# Patient Record
Sex: MALE | Race: WHITE | Employment: FULL TIME | URBAN - METROPOLITAN AREA
[De-identification: names, ages, dates, MRNs, and addresses within clinical notes are randomized per-mention and may not be internally consistent; named-entity substitution may affect disease eponyms.]

---

## 2017-07-21 ENCOUNTER — OCCUPATIONAL MEDICINE (OUTPATIENT)
Dept: URGENT CARE | Facility: CLINIC | Age: 22
End: 2017-07-21
Payer: COMMERCIAL

## 2017-07-21 VITALS
DIASTOLIC BLOOD PRESSURE: 70 MMHG | BODY MASS INDEX: 18.96 KG/M2 | OXYGEN SATURATION: 99 % | RESPIRATION RATE: 16 BRPM | HEART RATE: 86 BPM | TEMPERATURE: 98.4 F | SYSTOLIC BLOOD PRESSURE: 118 MMHG | WEIGHT: 140 LBS | HEIGHT: 72 IN

## 2017-07-21 DIAGNOSIS — S01.81XA FACIAL LACERATION, INITIAL ENCOUNTER: ICD-10-CM

## 2017-07-21 DIAGNOSIS — S02.5XXA CLOSED FRACTURE OF TOOTH, INITIAL ENCOUNTER: ICD-10-CM

## 2017-07-21 LAB
AMP AMPHETAMINE: NORMAL
BREATH ALCOHOL COMMENT: NORMAL
COC COCAINE: NORMAL
INT CON NEG: NORMAL
INT CON POS: NORMAL
MET METHAMPHETAMINES: NORMAL
OPI OPIATES: NORMAL
PCP PHENCYCLIDINE: NORMAL
POC BREATHALIZER: 0 PERCENT (ref 0–0.01)
POC DRUG COMMENT 753798-OCCUPATIONAL HEALTH: NORMAL
THC: NORMAL

## 2017-07-21 PROCEDURE — 12013 RPR F/E/E/N/L/M 2.6-5.0 CM: CPT | Mod: 29 | Performed by: PHYSICIAN ASSISTANT

## 2017-07-21 PROCEDURE — 82075 ASSAY OF BREATH ETHANOL: CPT | Mod: 29 | Performed by: PHYSICIAN ASSISTANT

## 2017-07-21 PROCEDURE — 80305 DRUG TEST PRSMV DIR OPT OBS: CPT | Mod: 29 | Performed by: PHYSICIAN ASSISTANT

## 2017-07-21 ASSESSMENT — ENCOUNTER SYMPTOMS: LIP LACERATION: 1

## 2017-07-21 NOTE — Clinical Note
EMPLOYEE’S CLAIM FOR COMPENSATION/ REPORT OF INITIAL TREATMENT  FORM C-4    EMPLOYEE’S CLAIM - PROVIDE ALL INFORMATION REQUESTED   First Name  Asael Last Name  Ron Birthdate                    1995                Sex  male Claim Number N/A   Home Address  [address] Age  22 y.o. 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 (if applicable)  RE-INSTALLING AN ENGINE IN VEHICLE    How did this injury or occupational disease occur? (Be specific an answer in detail. Use additional sheet if necessary)  WAS USING A PAIR OF PLYERS ABOVE MY HEAD TO HOLD A BRACKET INSTALL BOLT PLYERS SLIPPED OUT OF HAND AND HIT MY UPPER LIP   If you believe that you have an occupational disease, when did you first have knowledge of the disability and it relationship to your employment?   Witnesses to the Accident  JANINA JEFFERSON      Nature of Injury or Occupational Disease  Laceration  Part(s) of Body Injured or Affected  Mouth, ,     I certify that the above is true and correct to the best of my knowledge and that I have  provided this information in order to obtain the benefits of [place] inclusive or Chapter 617 of [place]RS).  I hereby authorize any physician, chiropractor, surgeon, practitioner, or other person, any hospital, including [facility] or [facility] hospital, any medical service organization, any insurance company, or other institution or organization to release to each other, any medical or other information, including benefits paid or payable, pertinent to this injury or disease, except information relative to diagnosis, treatment and/or counseling for AIDS, psychological conditions, alcohol or controlled substances, for which I must give specific authorization.  A Photostat of this authorization shall be as valid as the original.     Date 7/21/2017   [facility]   Employee’s Signature   THIS REPORT MUST BE COMPLETED AND MAILED WITHIN 3 WORKING DAYS OF TREATMENT   [facility]  Name of Facility  [facility]   Date  7/21/2017 Diagnosis  (S01.81XA) Facial laceration, initial encounter  (S02.5XXA) Closed fracture of tooth, initial encounter Is there evidence the injured employee was under the influence of alcohol and/or another controlled substance at the time of accident?   Hour  12:25 PM Description of Injury or Disease  Diagnoses of Facial laceration, initial encounter and Closed fracture of tooth, initial encounter were pertinent to this visit. No   Treatment  Lac closed with 4 sutures. Referral to Dentistry for chipped tooth.  Wound care discussed  F/U 3 days.  Have you advised the patient to remain off work five days or more? No   X-Ray Findings      If Yes   From Date  To Date      From information given by the employee, together with medical evidence, can you directly connect this injury or occupational disease as job incurred?  Yes If No Full Duty  Yes Modified Duty      Is additional medical care by a  "physician indicated?  Yes If Modified Duty, Specify any Limitations / Restrictions      Do you know of any previous injury or disease contributing to this condition or occupational disease?                            No   Date  7/21/2017 Print Doctor’s Name Sonny Cantor PA-C I certify the employer’s copy of  this form was mailed on:   Address  [address] Insurer’s Use Only     [facility] Zip  [address]    Provider’s Tax ID Number  [id number]  Telephone  Dept: [phone]        isra-SONNY Blue PA-C   e-Signature: Dr. Adriano Mccord, Medical Director Degree  IFE[employer]        ORIGINAL-TREATING PHYSICIAN OR CHIROPRACTOR    PAGE 2-INSURER/TPA    PAGE 3-EMPLOYER    PAGE 4-EMPLOYEE             Form C-4 (rev10/07)              BRIEF DESCRIPTION OF RIGHTS AND BENEFITS  (Pursuant to NRS 616C.050)    Notice of Injury or Occupational Disease (Incident Report Form C-1): If an injury or occupational disease (OD) arises out of and in the  course of employment, you must provide written notice to your employer as soon as practicable, but no later than 7 days after the accident or  OD. Your employer shall maintain a sufficient supply of the required forms.    Claim for Compensation (Form C-4): If medical treatment is sought, the form C-4 is available at the place of initial treatment. A completed  \"Claim for Compensation\" (Form C-4) must be filed within 90 days after an accident or OD. The treating physician or chiropractor must,  within 3 working days after treatment, complete and mail to the employer, the employer's insurer and third-party , the Claim for  Compensation.    Medical Treatment: If you require medical treatment for your on-the-job injury or OD, you may be required to select a physician or  chiropractor from a list provided by your workers’ compensation insurer, if it has contracted with an Organization for Managed Care (MCO) or  Preferred Provider Organization " (PPO) or providers of health care. If your employer has not entered into a contract with an MCO or PPO, you  may select a physician or chiropractor from the Panel of Physicians and Chiropractors. Any medical costs related to your industrial injury or  OD will be paid by your insurer.    Temporary Total Disability (TTD): If your doctor has certified that you are unable to work for a period of at least 5 consecutive days, or 5  cumulative days in a 20-day period, or places restrictions on you that your employer does not accommodate, you may be entitled to TTD  compensation.    Temporary Partial Disability (TPD): If the wage you receive upon reemployment is less than the compensation for TTD to which you are  entitled, the insurer may be required to pay you TPD compensation to make up the difference. TPD can only be paid for a maximum of 24  months.    Permanent Partial Disability (PPD): When your medical condition is stable and there is an indication of a PPD as a result of your injury or  OD, within 30 days, your insurer must arrange for an evaluation by a rating physician or chiropractor to determine the degree of your PPD. The  amount of your PPD award depends on the date of injury, the results of the PPD evaluation and your age and wage.    Permanent Total Disability (PTD): If you are medically certified by a treating physician or chiropractor as permanently and totally disabled  and have been granted a PTD status by your insurer, you are entitled to receive monthly benefits not to exceed 66 2/3% of your average  monthly wage. The amount of your PTD payments is subject to reduction if you previously received a PPD award.    Vocational Rehabilitation Services: You may be eligible for vocational rehabilitation services if you are unable to return to the job due to a  permanent physical impairment or permanent restrictions as a result of your injury or occupational disease.    Transportation and Per Marisela  Reimbursement: You may be eligible for travel expenses and per rylee associated with medical treatment.    Reopening: You may be able to reopen your claim if your condition worsens after claim closure.    Appeal Process: If you disagree with a written determination issued by the insurer or the insurer does not respond to your request, you may  appeal to the Department of Administration, , by following the instructions contained in your determination letter. You must  appeal the determination within 70 days from the date of the determination letter at [address], [address], [place], [place]  [address], or [address], [address], [place], [place] [address]. If you disagree with the  decision, you may appeal to the  Department of Administration, . You must file your appeal within 30 days from the date of the  decision  letter at [address], [address], [place], [place] [address], or [address], [facility] [address], [place], [place] [address]. If you  disagree with a decision of an , you may file a petition for judicial review with the District Court. You must do so within 30  days of the Appeal Officer’s decision. You may be represented by an  at your own expense or you may contact the [facility] for possible  representation.    [place]  for Injured Workers (NAIW): If you disagree with a  decision, you may request that NAIW represent you  without charge at an  Hearing. For information regarding denial of benefits, you may contact the [facility] at: [address], [address], [place] [address], [phone], or [address] [facility] [address] [address], [phone]    To File a Complaint with the Division: If you wish to file a complaint with the  of the Division of Industrial Relations (DIR),  please contact the Workers’ Compensation Section, [address], [address], [place], [place] [address], telephone [phone]) [phone], or  [address], [address], [place], [place] [address], telephone [phone].    For assistance with Workers’ Compensation Issues: you may contact the Office of the [facility] Consumer Health Assistance, [address] [place] [address], [address], [place], [place] [address], Toll Free 1-423.822.2459, Web site: http://govcha.[facility].nv., E-mail  [email or web address].[email or web address].                                                                                                                                                                                                                                   __________________________________________________________________                                                                   ______7/21/17___________                Employee Name / Signature                                                                                                                                                       Date                                                                                                                                                                                                     D-2 (rev. 10/07)

## 2017-07-21 NOTE — Clinical Note
EMPLOYEE’S CLAIM FOR COMPENSATION/ REPORT OF INITIAL TREATMENT  FORM C-4    EMPLOYEE’S CLAIM - PROVIDE ALL INFORMATION REQUESTED   First Name  Asael Last Name  Ron Birthdate                    1995                Sex  male Claim Number N/A   Home Address  [address]  Age  22 y.o. 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 (if applicable)  RE-INSTALLING AN ENGINE IN VEHICLE    How did this injury or occupational disease occur? (Be specific an answer in detail. Use additional sheet if necessary)  WAS USING A PAIR OF PLYERS ABOVE MY HEAD TO HOLD A BRACKET INSTALL BOLT PLYERS SLIPPED OUT OF HAND AND HIT MY UPPER LIP   If you believe that you have an occupational disease, when did you first have knowledge of the disability and it relationship to your employment?   Witnesses to the Accident  JANINA JEFFERSON      Nature of Injury or Occupational Disease  Laceration  Part(s) of Body Injured or Affected  Mouth, ,     I certify that the above is true and correct to the best of my knowledge and that I have provided this  information in order to obtain the benefits of [place] ([place] 616A [place] 616D, inclusive or Chapter 617 of NRS).  I hereby authorize any physician, chiropractor, surgeon, practitioner, or other person, any hospital, including [facility], any medical service organization, any insurance company, or other institution or organization to release to each other, any medical or other information, including benefits paid or payable, pertinent to this injury or disease, except information relative to diagnosis, treatment and/or counseling for AIDS, psychological conditions, alcohol or controlled substances, for which I must give specific authorization.  A Photostat of this authorization shall be as valid as the original.     Date 7/21/2017   [facility]   Employee’s Signature   THIS REPORT MUST BE COMPLETED AND MAILED WITHIN 3 WORKING DAYS OF TREATMENT   Place  [facility]  Name of Facility  [facility]   Date  7/21/2017 Diagnosis  (S01.81XA) Facial laceration, initial encounter  (S02.5XXA) Closed fracture of tooth, initial encounter Is there evidence the injured employee was under the influence of alcohol and/or another controlled substance at the time of accident?   Hour  12:25 PM Description of Injury or Disease  Diagnoses of Facial laceration, initial encounter and Closed fracture of tooth, initial encounter were pertinent to this visit. No   Treatment  Lac closed with 4 sutures. Referral to Dentistry for chipped tooth.  Wound care discussed  F/U 3 days.  Have you advised the patient to remain off work five days or more? No   X-Ray Findings      If Yes   From Date  To Date      From information given by the employee, together with medical evidence, can you directly connect this injury or occupational disease as job incurred?  Yes If No Full Duty  Yes Modified Duty      Is additional medical care by a physician  "indicated?  Yes If Modified Duty, Specify any Limitations / Restrictions      Do you know of any previous injury or disease contributing to this condition or occupational disease?                            No   Date  7/21/2017 Print Doctor’s Name Sonny Cantor PA-C I certify the employer’s copy of  this form was mailed on:   Address  [address] Insurer’s Use Only     [facility] Zip  [address]    Provider’s Tax ID Number  [id number]  Telephone  Dept: [phone]        isra-SONNY Blue PA-C   e-Signature: Dr. Adriano Mccord, Medical Director Degree  IFEANYI        ORIGINAL-TREATING PHYSICIAN OR CHIROPRACTOR    PAGE 2-INSURER/TPA    PAGE 3-EMPLOYER    PAGE 4-EMPLOYEE             Form C-4 (rev10/07)              BRIEF DESCRIPTION OF RIGHTS AND BENEFITS  ([place]ursuant to [place]RS 616C.050)    Notice of Injury or Occupational Disease (Incident Report Form C-1): If an injury or occupational disease (OD) arises out of and in the  course of employment, you must provide written notice to your employer as soon as practicable, but no later than 7 days after the accident or  OD. Your employer shall maintain a sufficient supply of the required forms.    Claim for Compensation (Form C-4): If medical treatment is sought, the form C-4 is available at the place of initial treatment. A completed  \"Claim for Compensation\" (Form C-4) must be filed within 90 days after an accident or OD. The treating physician or chiropractor must,  within 3 working days after treatment, complete and mail to the employer, the employer's insurer and third-party , the Claim for  Compensation.    Medical Treatment: If you require medical treatment for your on-the-job injury or OD, you may be required to select a physician or  chiropractor from a list provided by your workers’ compensation insurer, if it has contracted with an Organization for Managed Care (MCO) or  Preferred Provider Organization (PPO) or " providers of health care. If your employer has not entered into a contract with an MCO or PPO, you  may select a physician or chiropractor from the Panel of Physicians and Chiropractors. Any medical costs related to your industrial injury or  OD will be paid by your insurer.    Temporary Total Disability (TTD): If your doctor has certified that you are unable to work for a period of at least 5 consecutive days, or 5  cumulative days in a 20-day period, or places restrictions on you that your employer does not accommodate, you may be entitled to TTD  compensation.    Temporary Partial Disability (TPD): If the wage you receive upon reemployment is less than the compensation for TTD to which you are  entitled, the insurer may be required to pay you TPD compensation to make up the difference. TPD can only be paid for a maximum of 24  months.    Permanent Partial Disability (PPD): When your medical condition is stable and there is an indication of a PPD as a result of your injury or  OD, within 30 days, your insurer must arrange for an evaluation by a rating physician or chiropractor to determine the degree of your PPD. The  amount of your PPD award depends on the date of injury, the results of the PPD evaluation and your age and wage.    Permanent Total Disability (PTD): If you are medically certified by a treating physician or chiropractor as permanently and totally disabled  and have been granted a PTD status by your insurer, you are entitled to receive monthly benefits not to exceed 66 2/3% of your average  monthly wage. The amount of your PTD payments is subject to reduction if you previously received a PPD award.    Vocational Rehabilitation Services: You may be eligible for vocational rehabilitation services if you are unable to return to the job due to a  permanent physical impairment or permanent restrictions as a result of your injury or occupational disease.    Transportation and Per Marisela Reimbursement: You  may be eligible for travel expenses and per rylee associated with medical treatment.    Reopening: You may be able to reopen your claim if your condition worsens after claim closure.    Appeal Process: If you disagree with a written determination issued by the insurer or the insurer does not respond to your request, you may  appeal to the Department of Administration, , by following the instructions contained in your determination letter. You must  appeal the determination within 70 days from the date of the determination letter at [address], [address], [place], [place]  [address], or [address], [place], [place] [address]. If you disagree with the  decision, you may appeal to the  Department of Administration, . You must file your appeal within 30 days from the date of the  decision  letter at [address], [address], [place], [place] [address], or [address], [place], [place] [address]. If you  disagree with a decision of an , you may file a petition for judicial review with the District Court. You must do so within 30  days of the Appeal Officer’s decision. You may be represented by an  at your own expense or you may contact the [facility] for possible  representation.    [place]  for Inj[place] Workers ([place]IW): If you disagree with a  decision, you may request that NAIW represent you  without charge at an  Hearing. For information regarding denial of benefits, you may contact the [facility] at: [address], [address], [place] [address], [phone], or [address], [place] [address], [phone]    To File a Complaint with the Division: If you wish to file a complaint with the  of the Division of Industrial Relations (DIR),  please contact the Workers’ Compensation Section, [address],  [address], [place], [place] [address], telephone ([phone]) [phone], or  [address], [address], [place], [place] [address], telephone [phone].    For assistance with Workers’ Compensation Issues: you may contact the Office of the [employer] Consumer Health Assistance, [address] [place] [address], [address], [place], [place] [address], Toll Free 1-630.146.8071, Web site: http://govcha.[facility].nv., E-mail  [email or web address].                                                                                                                                                                                                                                   __________________________________________________________________                                                                   ______7/21/17___________                Employee Name / Signature                                                                                                                                                       Date                                                                                                                                                                                                     D-2 (rev. 10/07)

## 2017-07-21 NOTE — PATIENT INSTRUCTIONS
Sutured Wound Care  Sutures are stitches that can be used to close wounds. Taking care of your wound properly can help to prevent pain and infection. It can also help your wound to heal more quickly.  HOW TO CARE FOR YOUR SUTURED WOUND  Wound Care  · Keep the wound clean and dry.  · If you were given a bandage (dressing), you should change it at least once per day or as directed by your health care provider. You should also change it if it becomes wet or dirty.  · Keep the wound completely dry for the first 24 hours or as directed by your health care provider. After that time, you may shower or bathe. However, make sure that the wound is not soaked in water until the sutures have been removed.  · Clean the wound one time each day or as directed by your health care provider.  ¨ Wash the wound with soap and water.  ¨ Rinse the wound with water to remove all soap.  ¨ Pat the wound dry with a clean towel. Do not rub the wound.  · After cleaning the wound, apply a thin layer of antibiotic ointment as directed by your health care provider. This will help to prevent infection and keep the dressing from sticking to the wound.  · Have the sutures removed as directed by your health care provider.  General Instructions  · Take or apply medicines only as directed by your health care provider.  · To help prevent scarring, make sure to cover your wound with sunscreen whenever you are outside after the sutures are removed and the wound is healed. Make sure to wear a sunscreen of at least 30 SPF.  · If you were prescribed an antibiotic medicine or ointment, finish all of it even if you start to feel better.  · Do not scratch or pick at the wound.  · Keep all follow-up visits as directed by your health care provider. This is important.  · Check your wound every day for signs of infection. Watch for:    ¨ Redness, swelling, or pain.  ¨ Fluid, blood, or pus.  · Raise (elevate) the injured area above the level of your heart while you  are sitting or lying down, if possible.  · Avoid stretching your wound.  · Drink enough fluids to keep your urine clear or pale yellow.  SEEK MEDICAL CARE IF:  · You received a tetanus shot and you have swelling, severe pain, redness, or bleeding at the injection site.  · You have a fever.  · A wound that was closed breaks open.  · You notice a bad smell coming from the wound.  · You notice something coming out of the wound, such as wood or glass.  · Your pain is not controlled with medicine.  · You have increased redness, swelling, or pain at the site of your wound.  · You have fluid, blood, or pus coming from your wound.  · You notice a change in the color of your skin near your wound.  · You need to change the dressing frequently due to fluid, blood, or pus draining from the wound.  · You develop a new rash.  · You develop numbness around the wound.  SEEK IMMEDIATE MEDICAL CARE IF:  · You develop severe swelling around the injury site.  · Your pain suddenly increases and is severe.  · You develop painful lumps near the wound or on skin that is anywhere on your body.  · You have a red streak going away from your wound.  · The wound is on your hand or foot and you cannot properly move a finger or toe.  · The wound is on your hand or foot and you notice that your fingers or toes look pale or bluish.     This information is not intended to replace advice given to you by your health care provider. Make sure you discuss any questions you have with your health care provider.     Document Released: 01/25/2006 Document Revised: 05/03/2016 Document Reviewed: 12/14/2015  [employer] Interactive Patient Education ©2016 [employer]
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
## 2017-07-21 NOTE — Clinical Note
[facility]   [address] - [place] [address]  Phone: [phone] - Fax: [phone]        [facility] Progress Report and Disability Certification  Date of Service: 7/21/2017   No Show:  No  Date / Time of Next Visit:  3 days   Claim Information   Patient Name: Asael Velasquez  Claim Number:  N/A   Employer: [employer]  Date of Injury: 7/21/2017     Insurer / TPA: [employer]  ID / SSN:     Occupation: TECH  Diagnosis: Diagnoses of Facial laceration, initial encounter and Closed fracture of tooth, initial encounter were pertinent to this visit.    Medical Information   Related to Industrial Injury? Yes    Subjective Complaints:  DOI: 7/21/17. Pt was re-installing car engine. Pt was using large pair of pliers overhead. Pt was using them to pull and they slipped and struck right above lip. Laceration. Small abrasion on inner lip. Front right upper tooth has small chip. Denies HA, vision changes, dizziness. No previous injury. No 2nd job. Tetanus UTD.   Objective Findings: 2-3cm straight lac superior to the upper lip, not full thickness. No FB. No facial bone tenderness. Small superficial abrasion on inner upper lip. Small chip of the left upper central incisor.  Procedure: Laceration Repair  -Risks including bleeding, nerve damage, infection, and poor cosmetic outcome discussed at length. Benefits and alternatives discussed.   -Sterile technique throughout  -Local anesthesia with 2% lidocaine  -Closed with #4  5-0 Nylon interrupted sutures with good wound approximation  -Polysporin and dressing placed  -Patient tolerated well   Pre-Existing Condition(s): None   Assessment:   Initial Visit    Status: Additional Care Required  Permanent Disability:No    Plan: Consultation  Comments:Dentist    Diagnostics:      Comments:       Disability Information   Status: Released to Full Duty    From:     Through:   Restrictions are:     Physical  Restrictions   Sitting:    Standing:    Stooping:    Bending:      Squatting:    Walking:    Climbing:    Pushing:      Pulling:    Other:    Reaching Above Shoulder (L):   Reaching Above Shoulder (R):       Reaching Below Shoulder (L):    Reaching Below Shoulder (R):      Not to exceed Weight Limits   Carrying(hrs):   Weight Limit(lb):   Lifting(hrs):   Weight  Limit(lb):     Comments:      Repetitive Actions   Hands: i.e. Fine Manipulations from Grasping:     Feet: i.e. Operating Foot Controls:     Driving / Operate Machinery:     Physician Name: Sonny Cantor PA-C Physician Signature: SONNY Moraes PA-C e-Signature: Dr. Adriano Mccord, Medical Director   Clinic Name / Location: [address] [place] [address] Clinic Phone Number: Dept: [phone]   Appointment Time: 12:15 Pm Visit Start Time: 12:25 PM   Check-In Time:  12:13 Pm Visit Discharge Time: 1:30P PM   Original-Treating Physician or Chiropractor    Page 2-Insurer/TPA    Page 3-Employer    Page 4-Employee

## 2017-07-21 NOTE — PROGRESS NOTES
Subjective:      Asael Velasquez is a 22 y.o. male who presents with Lip Laceration      DOI: 7/21/17. Pt was re-installing car engine. Pt was using large pair of pliers overhead. Pt was using them to pull and they slipped and struck right above lip. Laceration. Small abrasion on inner lip. Front right upper tooth has small chip. Denies HA, vision changes, dizziness. No previous injury. No 2nd job. Tetanus UTD.     Lip Laceration   The incident occurred less than 1 hour ago. The laceration is located on the face. The laceration is 3 cm in size. The laceration mechanism was a blunt object. The pain is mild. The pain has been improving since onset. He reports no foreign bodies present. His tetanus status is UTD.       ROS    Medications, Allergies, and current problem list reviewed today in Epic     Objective:     /70 mmHg  Pulse 86  Temp(Src) 36.9 °C (98.4 °F)  Resp 16  Ht 1.829 m (6')  Wt 63.504 kg (140 lb)  BMI 18.98 kg/m2  SpO2 99%     Physical Exam   Constitutional: He is oriented to person, place, and time. He appears well-developed and well-nourished. No distress.   HENT:   Head: Normocephalic and atraumatic.   Right Ear: External ear normal.   Left Ear: External ear normal.   Neurological: He is alert and oriented to person, place, and time.   Skin: Skin is warm and dry. He is not diaphoretic.   Psychiatric: He has a normal mood and affect. His behavior is normal. Judgment and thought content normal.   Nursing note and vitals reviewed.      2.5-3cm straight lac superior to the upper lip, not full thickness. No FB. No facial bone tenderness. Small superficial abrasion on inner upper lip. Small chip of the left upper central incisor.  Procedure: Laceration Repair  -Risks including bleeding, nerve damage, infection, and poor cosmetic outcome discussed at length. Benefits and alternatives discussed.   -Sterile technique throughout  -Local anesthesia with 2% lidocaine  -Closed with #4  5-0 Nylon  interrupted sutures with good wound approximation  -Polysporin and dressing placed  -Patient tolerated well       Assessment/Plan:     1. Facial laceration, initial encounter  POCT 6 Panel Urine Drug Screen    POCT Breath Alcohol Test   2. Closed fracture of tooth, initial encounter  REFERRAL TO DENTISTRY     Lac closed with 4 sutures.  Wound care discussed, handout given  Watch for signs of infection  OTC meds for pain  Tetanus UTD  Referral to Dentistry for chipped tooth  F/U in 3 days, 5-7 for removal  Full duty  Return to clinic or go to ED if symptoms worsen or persist. Indications for ED discussed at length. Patient voices understanding. Red flags discussed.      Please note that this dictation was created using voice recognition software. I have made every reasonable attempt to correct obvious errors, but I expect that there are errors of grammar and possibly content that I did not discover before finalizing the note.

## 2017-07-27 ENCOUNTER — OCCUPATIONAL MEDICINE (OUTPATIENT)
Dept: URGENT CARE | Facility: CLINIC | Age: 22
End: 2017-07-27
Payer: COMMERCIAL

## 2017-07-27 VITALS
WEIGHT: 140 LBS | DIASTOLIC BLOOD PRESSURE: 70 MMHG | BODY MASS INDEX: 18.96 KG/M2 | OXYGEN SATURATION: 100 % | RESPIRATION RATE: 16 BRPM | SYSTOLIC BLOOD PRESSURE: 100 MMHG | HEART RATE: 75 BPM | HEIGHT: 72 IN | TEMPERATURE: 98.7 F

## 2017-07-27 DIAGNOSIS — S01.81XD FACIAL LACERATION, SUBSEQUENT ENCOUNTER: ICD-10-CM

## 2017-07-27 DIAGNOSIS — S02.5XXD CLOSED FRACTURE OF TOOTH WITH ROUTINE HEALING, SUBSEQUENT ENCOUNTER: ICD-10-CM

## 2017-07-27 PROCEDURE — 99213 OFFICE O/P EST LOW 20 MIN: CPT | Mod: 29 | Performed by: NURSE PRACTITIONER

## 2017-07-27 ASSESSMENT — ENCOUNTER SYMPTOMS
EYES NEGATIVE: 1
NEUROLOGICAL NEGATIVE: 1
MUSCULOSKELETAL NEGATIVE: 1
CARDIOVASCULAR NEGATIVE: 1
GASTROINTESTINAL NEGATIVE: 1
ROS SKIN COMMENTS: LACERATION
SORE THROAT: 0
CONSTITUTIONAL NEGATIVE: 1
PSYCHIATRIC NEGATIVE: 1
HEADACHES: 0
RESPIRATORY NEGATIVE: 1
STRIDOR: 0

## 2017-07-27 NOTE — PROGRESS NOTES
Subjective:      Asael Velasquez is a 22 y.o. male who presents with Follow-Up      HPI  DOI: 7/21/17. Pt was re-installing car engine. Pt was using large pair of pliers overhead. Pt was using them to pull and they slipped and struck right above lip causing a laceration and abrasion to lip. Front right upper tooth has small chip for incident. Denies HA, vision changes, dizziness. No previous injury. No 2nd job. Tetanus UTD. Was seen in  that day, sutures were placed. Patient was referred to dentistry for tooth.     No past medical history on file.  No past surgical history on file.  Current Outpatient Prescriptions on File Prior to Visit   Medication Sig Dispense Refill   • ibuprofen (MOTRIN) 200 MG Tab Take 200 mg by mouth every 6 hours as needed.     • aspirin (ASA) 325 MG Tab Take 325 mg by mouth every 6 hours as needed.       No current facility-administered medications on file prior to visit.     Review of patient's allergies indicates no known allergies.    Review of Systems   Constitutional: Negative.    HENT: Negative for sore throat.         Dental chip   Eyes: Negative.    Respiratory: Negative.  Negative for stridor.    Cardiovascular: Negative.    Gastrointestinal: Negative.    Genitourinary: Negative.    Musculoskeletal: Negative.    Skin:        laceration   Neurological: Negative.  Negative for headaches.   Endo/Heme/Allergies: Negative.    Psychiatric/Behavioral: Negative.           Objective:     /70 mmHg  Pulse 75  Temp(Src) 37.1 °C (98.7 °F)  Resp 16  Ht 1.829 m (6')  Wt 63.504 kg (140 lb)  BMI 18.98 kg/m2  SpO2 100%     Physical Exam   Constitutional: He is oriented to person, place, and time. Vital signs are normal. He appears well-developed and well-nourished. He does not appear ill. No distress.   HENT:   Head: Normocephalic.   Right Ear: External ear normal.   Left Ear: External ear normal.   Nose: Nose normal.   Mouth/Throat: Uvula is midline, oropharynx is clear and moist  and mucous membranes are normal. No oral lesions. No trismus in the jaw. Abnormal dentition. No uvula swelling. No oropharyngeal exudate.       2.5-3cm straight lac superior to the upper lip, not full thickness. 4 sutures in place, edges well approximated. No surrounding swelling or erythema. No facial bone tenderness. Small chip of the left upper central incisor, non-tender.    Eyes: Conjunctivae are normal. Pupils are equal, round, and reactive to light. Right eye exhibits no discharge. Left eye exhibits no discharge.   Neck: Normal range of motion. Neck supple.   Cardiovascular: Normal rate, regular rhythm, normal heart sounds and intact distal pulses.    Pulmonary/Chest: Effort normal and breath sounds normal. No respiratory distress.   Musculoskeletal: Normal range of motion. He exhibits no edema or tenderness.   Lymphadenopathy:     He has no cervical adenopathy.   Neurological: He is alert and oriented to person, place, and time. He has normal strength. No cranial nerve deficit or sensory deficit. He exhibits normal muscle tone. Coordination and gait normal. GCS eye subscore is 4. GCS verbal subscore is 5. GCS motor subscore is 6.   Skin: Skin is warm. Laceration noted. No abrasion, no bruising, no ecchymosis and no rash noted. He is not diaphoretic. No erythema. No pallor.   Psychiatric: He has a normal mood and affect. His behavior is normal. Judgment and thought content normal.   Vitals reviewed.           Assessment/Plan:     1. Facial laceration, subsequent encounter     2. Closed fracture of tooth with routine healing, subsequent encounter             Sutures removed, steri-strips applied, wound care, follow up with dentistry. RTC 7/31/17 for re-eval and possible MMI.   Supportive care, differential diagnoses, and indications for immediate follow-up discussed with patient.   Pathogenesis of diagnosis discussed including typical length and natural progression.   Instructed to return to clinic or nearest  emergency department sooner for any change in condition, further concerns, or worsening of symptoms.  Patient states understanding of the plan of care and discharge instructions.          LIZET Nair.
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
## 2017-07-27 NOTE — Clinical Note
[facility] [address] - [place] [address]  Phone: [phone] - Fax: [phone]        [facility] Progress Report and Disability Certification  Date of Service: 7/27/2017   No Show:  No  Date / Time of Next Visit:  7/31/17   Claim Information   Patient Name: Asael Velasquez  Claim Number:     Employer: [employer]  Date of Injury: 7/21/2017     Insurer / TPA: [employer]  ID / SSN:     Occupation: TECH  Diagnosis: Diagnoses of Facial laceration, subsequent encounter and Closed fracture of tooth with routine healing, subsequent encounter were pertinent to this visit.    Medical Information   Related to Industrial Injury? Yes    Subjective Complaints:  DOI: 7/21/17. Pt was re-installing car engine. Pt was using large pair of pliers overhead. Pt was using them to pull and they slipped and struck right above lip causing a laceration and abrasion to lip. Front right upper tooth has small chip for incident. Denies HA, vision changes, dizziness. No previous injury. No 2nd job. Tetanus UTD. Was seen in  that day, sutures were placed. Patient was referred to dentistry for tooth.    Objective Findings: A/Ox4. NAD. 2.5-3cm straight lac superior to the upper lip, not full thickness. 4 sutures in place, edges well approximated. No surrounding swelling or erythema. No facial bone tenderness. Small chip of the left upper central incisor, non-tender.    Pre-Existing Condition(s): Denies    Assessment:   Condition Improved    Status: Additional Care Required  Permanent Disability:No    Plan: Medication  Comments:Sutures removed, steri-strips, wound care, follow up with dentistry.      Diagnostics:      Comments:       Disability Information   Status: Released to Full Duty    From:     Through:   Restrictions are:     Physical Restrictions   Sitting:    Standing:    Stooping:    Bending:      Squatting:    Walking:    Climbing:    Pushing:         Pulling:    Other:    Reaching Above Shoulder (L):   Reaching Above Shoulder (R):       Reaching Below Shoulder (L):    Reaching Below Shoulder (R):      Not to exceed Weight Limits   Carrying(hrs):   Weight Limit(lb):   Lifting(hrs):   Weight  Limit(lb):     Comments:      Repetitive Actions   Hands: i.e. Fine Manipulations from Grasping:     Feet: i.e. Operating Foot Controls:     Driving / Operate Machinery:     Physician Name: OTILIA Nair Physician Signature: REYES Weldon e-Signature: Dr. Adriano Mccord, Medical Director   Clinic Name / Location: [address]  [place] [address] Clinic Phone Number: Dept: [phone]   Appointment Time: 1:00 Pm Visit Start Time: 1:21 PM   Check-In Time:  12:56 Pm Visit Discharge Time:  1:52 pm   Original-Treating Physician or Chiropractor    Page 2-Insurer/TPA    Page 3-Employer    Page 4-Employee

## 2017-07-31 ENCOUNTER — OCCUPATIONAL MEDICINE (OUTPATIENT)
Dept: URGENT CARE | Facility: CLINIC | Age: 22
End: 2017-07-31
Payer: COMMERCIAL

## 2017-07-31 VITALS
SYSTOLIC BLOOD PRESSURE: 110 MMHG | OXYGEN SATURATION: 98 % | HEART RATE: 72 BPM | TEMPERATURE: 97.9 F | BODY MASS INDEX: 18.98 KG/M2 | WEIGHT: 140 LBS | DIASTOLIC BLOOD PRESSURE: 70 MMHG | RESPIRATION RATE: 20 BRPM

## 2017-07-31 DIAGNOSIS — S02.5XXD CLOSED FRACTURE OF TOOTH WITH ROUTINE HEALING, SUBSEQUENT ENCOUNTER: ICD-10-CM

## 2017-07-31 DIAGNOSIS — S01.81XA FACIAL LACERATION, INITIAL ENCOUNTER: ICD-10-CM

## 2017-07-31 PROCEDURE — 99212 OFFICE O/P EST SF 10 MIN: CPT | Mod: 29 | Performed by: PHYSICIAN ASSISTANT

## 2017-07-31 ASSESSMENT — ENCOUNTER SYMPTOMS
TINGLING: 0
FOCAL WEAKNESS: 0
HEADACHES: 0
CHILLS: 0
FEVER: 0
DIZZINESS: 0
VOMITING: 0
NAUSEA: 0
SENSORY CHANGE: 0
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
## 2017-07-31 NOTE — PROGRESS NOTES
Subjective:      Asael Velasquez is a 22 y.o. male who presents with Follow-Up      DOI: 7/21/17. Pt was re-installing car engine. Pt was using large pair of pliers overhead. Pt was using them to pull and they slipped and struck right above lip causing a laceration and abrasion to lip. Front right upper tooth has small chip for incident. Denies HA, vision changes, dizziness. No previous injury. No 2nd job. Tetanus UTD.  Patient was referred to dentistry for tooth. The patient had steri-strips placed 4 days ago. Steri strips no longer in place.     HPI    No past medical history on file.  No past surgical history on file.    No family history on file.    No Known Allergies    Medications, Allergies, and current problem list reviewed today in Epic    Review of Systems   Constitutional: Negative for fever and chills.   Gastrointestinal: Negative for nausea and vomiting.   Neurological: Negative for dizziness, tingling, sensory change, focal weakness and headaches.     All other systems reviewed and are negative.        Objective:     /70 mmHg  Pulse 72  Temp(Src) 36.6 °C (97.9 °F)  Resp 20  Wt 63.504 kg (140 lb)  SpO2 98%     Physical Exam   Constitutional: He is oriented to person, place, and time. He appears well-developed and well-nourished. No distress.   Neurological: He is alert and oriented to person, place, and time. No cranial nerve deficit.   Psychiatric: He has a normal mood and affect. His behavior is normal. Judgment and thought content normal.       Vitals reviewed.  Mouth: Skin- 2.5-3cm straight lac superior to upper lip.healing well. No skin erythema or edema. No wound dehiscence. No purulent drainage or pus.  Small chip to left upper central incisor.        Assessment/Plan:     1. Facial laceration, initial encounter     2. Closed fracture of tooth with routine healing, subsequent encounter         Wound healing well. Discharged/MMI  Follow-up with dentist regarding tooth.     Differential  diagnoses, Supportive care, and indications for immediate follow-up discussed with patient.   Instructed to return to clinic or nearest emergency department for any change in condition, further concerns, or worsening of symptoms.    The patient demonstrated a good understanding and agreed with the treatment plan.    Vicki Simmons PA-C

## 2017-07-31 NOTE — Clinical Note
[facility] [address] - [place] [address]  Phone: [phone] - Fax: [phone]        [facility] Progress Report and Disability Certification  Date of Service: 7/31/2017   No Show:  No  Date / Time of Next Visit:  Discharged/MMI   Claim Information   Patient Name: Asael Velasquez  Claim Number:     Employer: [employer]  Date of Injury: 7/21/2017     Insurer / TPA: [employer]  ID / SSN:     Occupation: TECH  Diagnosis: The encounter diagnosis was Facial laceration, initial encounter.    Medical Information   Related to Industrial Injury? Yes    Subjective Complaints:  DOI: 7/21/17. Pt was re-installing car engine. Pt was using large pair of pliers overhead. Pt was using them to pull and they slipped and struck right above lip causing a laceration and abrasion to lip. Front right upper tooth has small chip for incident. Denies HA, vision changes, dizziness. No previous injury. No 2nd job. Tetanus UTD.  Patient was referred to dentistry for tooth. The patient had steri-strips placed 4 days ago. Steri strips no longer in place.   Objective Findings: Vitals reviewed.  Mouth: Skin- 2.5-3cm straight lac superior to upper lip.healing well. No skin erythema or edema. No wound dehiscence. No purulent drainage or pus.  Small chip to left upper central incisor.    Pre-Existing Condition(s):     Assessment:   Condition Improved    Status: Discharged /  MMI  Permanent Disability:No    Plan:      Diagnostics:      Comments:       Disability Information   Status: Released to Full Duty    From:     Through:   Restrictions are:     Physical Restrictions   Sitting:    Standing:    Stooping:    Bending:      Squatting:    Walking:    Climbing:    Pushing:      Pulling:    Other:    Reaching Above Shoulder (L):   Reaching Above Shoulder (R):       Reaching Below Shoulder (L):    Reaching Below Shoulder (R):      Not to exceed Weight Limits      Carrying(hrs):   Weight Limit(lb):   Lifting(hrs):   Weight  Limit(lb):     Comments: Follow-up with Dentist regarding tooth    Repetitive Actions   Hands: i.e. Fine Manipulations from Grasping:     Feet: i.e. Operating Foot Controls:     Driving / Operate Machinery:     Physician Name: Caren Simmons PA-C Physician Signature: CAREN Rollins PA-C e-Signature: Dr. Adriano Mccord, Medical Director   Clinic Name / Location: [address] [place] [address] Clinic Phone Number: Dept: [phone]   Appointment Time: 1:00 Pm Visit Start Time: 1:14 PM   Check-In Time:  1:02 Pm Visit Discharge Time:  1:46 PM   Original-Treating Physician or Chiropractor    Page 2-Insurer/TPA    Page 3-Employer    Page 4-Employee

## 2020-10-27 ENCOUNTER — HOSPITAL ENCOUNTER (OUTPATIENT)
Dept: LAB | Facility: MEDICAL CENTER | Age: 25
End: 2020-10-27
Payer: COMMERCIAL

## 2020-10-28 LAB
COVID ORDER STATUS COVID19: NORMAL
SARS-COV-2 RNA RESP QL NAA+PROBE: NOTDETECTED
SPECIMEN SOURCE: NORMAL

## 2020-11-30 ENCOUNTER — HOSPITAL ENCOUNTER (OUTPATIENT)
Dept: LAB | Facility: MEDICAL CENTER | Age: 25
End: 2020-11-30
Payer: COMMERCIAL

## 2020-11-30 PROCEDURE — U0003 INFECTIOUS AGENT DETECTION BY NUCLEIC ACID (DNA OR RNA); SEVERE ACUTE RESPIRATORY SYNDROME CORONAVIRUS 2 (SARS-COV-2) (CORONAVIRUS DISEASE [COVID-19]), AMPLIFIED PROBE TECHNIQUE, MAKING USE OF HIGH THROUGHPUT TECHNOLOGIES AS DESCRIBED BY CMS-2020-01-R: HCPCS

## 2020-12-17 ENCOUNTER — HOSPITAL ENCOUNTER (OUTPATIENT)
Dept: LAB | Facility: MEDICAL CENTER | Age: 25
End: 2020-12-17
Payer: COMMERCIAL

## 2020-12-17 LAB — COVID ORDER STATUS COVID19: NORMAL

## 2020-12-18 LAB
SARS-COV-2 RNA RESP QL NAA+PROBE: NOTDETECTED
SPECIMEN SOURCE: NORMAL